# Patient Record
Sex: FEMALE | Race: WHITE | NOT HISPANIC OR LATINO | URBAN - METROPOLITAN AREA
[De-identification: names, ages, dates, MRNs, and addresses within clinical notes are randomized per-mention and may not be internally consistent; named-entity substitution may affect disease eponyms.]

---

## 2017-02-23 ENCOUNTER — OUTPATIENT (OUTPATIENT)
Dept: OUTPATIENT SERVICES | Facility: HOSPITAL | Age: 52
LOS: 1 days | End: 2017-02-23
Payer: COMMERCIAL

## 2017-02-23 ENCOUNTER — APPOINTMENT (OUTPATIENT)
Dept: MRI IMAGING | Facility: IMAGING CENTER | Age: 52
End: 2017-02-23

## 2017-02-23 DIAGNOSIS — Z00.8 ENCOUNTER FOR OTHER GENERAL EXAMINATION: ICD-10-CM

## 2017-04-13 PROCEDURE — A9585: CPT

## 2017-04-13 PROCEDURE — C8937: CPT

## 2017-04-13 PROCEDURE — C8908: CPT

## 2017-07-24 ENCOUNTER — RESULT REVIEW (OUTPATIENT)
Age: 52
End: 2017-07-24

## 2017-08-07 ENCOUNTER — TRANSCRIPTION ENCOUNTER (OUTPATIENT)
Age: 52
End: 2017-08-07

## 2017-09-29 ENCOUNTER — RESULT REVIEW (OUTPATIENT)
Age: 52
End: 2017-09-29

## 2017-12-21 ENCOUNTER — HOSPITAL ENCOUNTER (OUTPATIENT)
Dept: HOSPITAL 74 - FMAMMOTONE | Age: 52
Discharge: HOME | End: 2017-12-21
Attending: SURGERY
Payer: COMMERCIAL

## 2017-12-21 DIAGNOSIS — N62: ICD-10-CM

## 2017-12-21 DIAGNOSIS — N64.89: ICD-10-CM

## 2017-12-21 DIAGNOSIS — N60.11: Primary | ICD-10-CM

## 2017-12-21 PROCEDURE — 0HBT3ZX EXCISION OF RIGHT BREAST, PERCUTANEOUS APPROACH, DIAGNOSTIC: ICD-10-PCS

## 2017-12-21 PROCEDURE — A4648 IMPLANTABLE TISSUE MARKER: HCPCS

## 2017-12-22 NOTE — PATH
Surgical Pathology Report



Patient Name:  VANNESA ROCHE

Accession #:  [id number]

Med. Rec. #:  [id number]                                                        

   /Age/Gender:  1965 (Age: 52) / F

Account:  [id number]                                                          

             Location: [facility]

Taken:  2017

Received:  2017

Reported:  2017

Physicians:  ELVIS Espana M.D.

  



Specimen(s) Received

A: RIGHT BREAST SPECIMEN WITH CALCIFICATIONS 

B: RIGHT BREAST SPECIMEN WITHOUT CALCIFICATIONS 





Clinical History

Nonpalpable lesion

Mammographic findings: Microcalcification, suspicious







Final Diagnosis

A. BREAST, RIGHT, WITH CALCIFICATIONS, STEREOTACTIC BIOPSY:

COLUMNAR CELL CHANGE WITH MICROCYST FORMATION, FOCAL ATYPICAL LOBULAR

HYPERPLASIA (ALH) AND ASSOCIATED CALCIFICATIONS. (SEE NOTE)



Note:E-Cadherin immunostain (performed at [facility]) is

negative in the foci of ALH, which supports lobular phenotype.



B. BREAST, RIGHT, WITHOUT CALCIFICATIONS, STEREOTACTIC BIOPSY:

COLUMNAR CELL CHANGE WITH MICROCYST FORMATION, FOCAL ATYPICAL LOBULAR

HYPERPLASIA (ALH) AND FEW ASSOCIATED CALCIFICATIONS.





***Electronically Signed***

Millie Dalton M.D.





Gross Description

A.  Received in formalin, labeled "right breast with calcifications," are 4

tan-yellow, cylindrical portions of fibroadipose tissue ranging from 0.6-2.3 cm.

in length and averaging 0.3 cm. in diameter. The specimen is submitted in toto

in one cassette. 



B. Received in formalin labeled "right breast without calcifications," is a 2.0

x 1.3 x 0.3 cm aggregate of multiple tan-yellow, irregular to cylindrical

portions of fibroadipose tissue. The formalin is filtered and the specimen is

entirely submitted in one cassette.

Time to formalin fixation: 5 minutes

Total formalin fixation time: Approximately 6 hours.

## 2018-01-10 NOTE — HP
Admitting History and Physical





- Primary Care Physician


PCP: Emre Dyson





- Admission


Chief Complaint: Right breast Atypia


History of Present Illness: 





52 year old premenapusal female with recent mammogram showing 2017 showing 

calcifications right upper outer aspect. Right breast stereotactic core biopsy 

showed ALH 2017.  Another area posteriorly could not be biopsied 

stereotactically.This is her third biopsy of atypia and inability to 

stereotactically localize the posterior calcification therefore excision of 

both areas  with needle localization of clip with atypia and posterior 

calcifications. 


History Source: Patient


Limitations to Obtaining History: No Limitations





- Past Medical History


...LMP: 13





- Past Surgical History


Additional Past Surgical History: 





Right breast excision atypia 2012


Right breast excision  LCIS





- Smoking History


Smoking history: Never smoked


Have you smoked in the past 12 months: No





- Alcohol/Substance Use


Hx Alcohol Use: No





Home Medications





- Allergies


Allergies/Adverse Reactions: 


 Allergies











Allergy/AdvReac Type Severity Reaction Status Date / Time


 


No Known Allergies Allergy   Verified 12 10:25














- Home Medications


Home Medications: 


Ambulatory Orders





Norethindrone AC-Eth Estradiol [Loestrin 21 1-20 Tablet] 1 each PO DAILY  


Rizatriptan Benzoate [Maxalt] 10 mg PO DAILY PRN 14 


Oxycodone HCl/Acetaminophen [Percocet 5-325 mg Tablet -] 1 - 2 tab PO Q6H PRN #

20 tab 14 











Family Disease History





- Family Disease History


Family Disease History: CA: Grandparent (mat GM gastric ca )





Physical Examination


Constitutional: Yes: No Distress


Breast(s): Yes: Other (C cup breasts symmetrical no palpable densities or 

adenopathy bilaterally)





Problem List





- Problems


(1) Atypical ductal hyperplasia of right breast


Code(s): N60.91 - UNSPECIFIED BENIGN MAMMARY DYSPLASIA OF RIGHT BREAST   





Assessment/Plan





Right breast wide excision  with needle localiztion x2

## 2018-01-18 ENCOUNTER — HOSPITAL ENCOUNTER (OUTPATIENT)
Dept: HOSPITAL 74 - FASU | Age: 53
Discharge: HOME | End: 2018-01-18
Attending: SURGERY
Payer: COMMERCIAL

## 2018-01-18 VITALS — TEMPERATURE: 97.9 F

## 2018-01-18 VITALS — DIASTOLIC BLOOD PRESSURE: 69 MMHG | HEART RATE: 95 BPM | SYSTOLIC BLOOD PRESSURE: 118 MMHG

## 2018-01-18 VITALS — BODY MASS INDEX: 33 KG/M2

## 2018-01-18 DIAGNOSIS — N62: ICD-10-CM

## 2018-01-18 DIAGNOSIS — N64.89: ICD-10-CM

## 2018-01-18 DIAGNOSIS — N60.31: ICD-10-CM

## 2018-01-18 DIAGNOSIS — N60.91: Primary | ICD-10-CM

## 2018-01-18 PROCEDURE — 0HBT0ZX EXCISION OF RIGHT BREAST, OPEN APPROACH, DIAGNOSTIC: ICD-10-PCS | Performed by: SURGERY

## 2018-01-19 NOTE — OP
DATE OF OPERATION:  2018

 

PREOPERATIVE DIAGNOSIS:  Right breast upper outer quadrant atypia.  

 

POSTOPERATIVE DIAGNOSIS:  Right breast upper outer quadrant atypia.  

 

PROCEDURE:  Right breast wide excision with needle localization x2.  

 

ANESTHESIA:  General laryngeal mask airway anesthesia. 

 

PRIMARY SURGEON:  Sandra Dyson MD

 

FIRST ASSISTANT:  SILVA Caballero

 

COMPLICATIONS:  There were no complications.  

 

INDICATIONS:  Briefly, the patient is a 52-year-old  premenopausal white female

with no family history of breast or ovarian cancer.  She has undergone 2 right breast

wide excisions in the past in  and  for atypical duct hyperplasia.  She has

been followed closely with the early mammography ultrasound and MRIs given her

increased scale model risk.  She was recently found to have some new calcifications

on the upper outer aspect of the right breast, and stereotactic biopsy showed a focal

area of atypical lobular hyperplasia.  The patient was advised on undergoing a wide

excision again.  There were some more calcifications posteriorly for which a 2nd

needle localization was recommended.   

 

The patient was brought in for the wide excision through ambulatory surgery on

2018.  She underwent needle localizations of the clip in the upper outer

aspect of the right breast as well as the calcifications just posterior to this.  She

was then brought to the holding area.  In the holding area, site verification was

made and informed consent was obtained.  

 

DESCRIPTION OF PROCEDURE:  She was brought into the operating room and laid on the OR

table in the supine position.  Venodynes were placed on the lower extremities prior

to induction.  She did not receive any antibiotics given the small nature of the

excision.  She was given general laryngeal mask airway anesthesia.  The right breast

was sterilely prepped and draped in the usual fashion.  

 

Lidocaine 1% and bupivacaine were given underneath the area of the needle

localizations, and a curvilinear incision was made towards the upper outer aspect of

the right breast, and dissection was undertaken around both needle localizations. 

The wide excision was able to remove both wires with the same specimen with the 1

wire more anterior where the clip was and the calcifications more posterior.  The

specimen was removed and oriented with the long-lateral short-superior suture. 

Specimen radiographs showed removal of the clip as well as calcifications. 

Hemostasis was achieved.  

 

The breast parenchyma was then reapproximated using 2-0 plain suture.  The skin was

closed using interrupted 3-0 deep dermal Vicryl suture and a running 4-0 subcuticular

Biosyn suture.  Mastisol and Steri-Strips were applied over the wound with a

compressive dressing placed over this.  The patient tolerated the procedure well

without difficulty, and laryngeal mask airway tube was removed at the end of the

case.  She will be brought to the post-anesthesia care unit where she will be

recovered and discharged home the same day once her discharge criteria are met.  She

is to follow up in the office in one week for a formal wound pathology check.  All

sponge and needle counts were correct at the end of the case, and estimated blood

loss was minimal.  .  

 

 

SANDRA DYSON M.D.

 

[id number]

DD: 2018 18:54

DT: 2018 07:17

Job #:  [id number]

## 2018-01-23 NOTE — PATH
Surgical Pathology Report



Patient Name:  VANNESA ROCHE

Accession #:  [id number]

[facility]. Rec. #:  [id number]                                                        

   /Age/Gender:  1965 (Age: 52) / F

Account:  [id number]                                                          

             Location: [facility] 

Taken:  2018

Received:  2018

Reported:  2018

Physicians:  Emre Dyson M.D.

  



Specimen(s) Received

 RIGHT BREAST WIDE EXCISION 





Clinical History

Atypia on previous core biopsy

Mammographic findings: Microcalcification







Final Diagnosis

BREAST, RIGHT, WIDE EXCISION:

ATYPICAL LOBULAR HYPERPLASIA (ALH) AND ATYPICAL DUCTAL HYPERPLASIA (ADH) IN A

BACKGROUND OF PRIOR BIOPSY CHANGES, FIBROCYSTIC CHANGES INCLUDING STROMAL

FIBROSIS, APOCRINE METAPLASIA, MICROCYSTS, COLUMNAR CELL CHANGES, AND ASSOCIATED

MICROCALCIFICATIONS.





***Electronically Signed***

Charlene Jean M.D.





Gross Description

Received in formalin, labeled "right breast wide excision," is a 5.3 x 3.7 x 2.1

cm. tan-yellow, irregular, portion of fibroadipose tissue 2 needle localization

wires present. There is a short suture marking the superior aspect and a long

suture marking the lateral aspect, per the surgeon. There is no skin or nipple

present. The specimen is inked as follows: superior and lateral blue; inferior

green; medial yellow; anterior red; deep black. The specimen is serially

sectioned from medial to lateral. Sectioning reveals a focus of hemorrhage

surrounded by dense, white, firm fibrous tissue. No definitive mass is

identified. Representative sections are submitted in 7 cassettes as follows:

1-4-fibrous tissue (each with superior, anterior and deep margins); 5-inferior

margin; 6-medial margin; 7-lateral margin.



Total formalin fixation time: Between 34-36 hours

## 2018-05-24 ENCOUNTER — RESULT REVIEW (OUTPATIENT)
Age: 53
End: 2018-05-24

## 2018-09-05 ENCOUNTER — OUTPATIENT (OUTPATIENT)
Dept: OUTPATIENT SERVICES | Facility: HOSPITAL | Age: 53
LOS: 1 days | End: 2018-09-05
Payer: COMMERCIAL

## 2018-09-05 ENCOUNTER — APPOINTMENT (OUTPATIENT)
Dept: MRI IMAGING | Facility: IMAGING CENTER | Age: 53
End: 2018-09-05
Payer: COMMERCIAL

## 2018-09-05 DIAGNOSIS — Z00.8 ENCOUNTER FOR OTHER GENERAL EXAMINATION: ICD-10-CM

## 2018-09-05 PROCEDURE — 0159T: CPT | Mod: 26

## 2018-09-05 PROCEDURE — 77059 MRI BREAST BILATERAL: CPT | Mod: 26

## 2018-09-05 PROCEDURE — A9585: CPT

## 2018-09-05 PROCEDURE — C8937: CPT

## 2018-09-05 PROCEDURE — C8908: CPT

## 2018-12-05 ENCOUNTER — RESULT REVIEW (OUTPATIENT)
Age: 53
End: 2018-12-05

## 2019-01-03 ENCOUNTER — APPOINTMENT (OUTPATIENT)
Dept: ORTHOPEDIC SURGERY | Facility: CLINIC | Age: 54
End: 2019-01-03
Payer: COMMERCIAL

## 2019-01-03 VITALS
DIASTOLIC BLOOD PRESSURE: 82 MMHG | BODY MASS INDEX: 32.1 KG/M2 | WEIGHT: 170 LBS | HEART RATE: 79 BPM | SYSTOLIC BLOOD PRESSURE: 122 MMHG | HEIGHT: 61 IN

## 2019-01-03 DIAGNOSIS — M54.5 LOW BACK PAIN: ICD-10-CM

## 2019-01-03 DIAGNOSIS — Z87.19 PERSONAL HISTORY OF OTHER DISEASES OF THE DIGESTIVE SYSTEM: ICD-10-CM

## 2019-01-03 PROCEDURE — 99204 OFFICE O/P NEW MOD 45 MIN: CPT

## 2019-01-03 PROCEDURE — 72100 X-RAY EXAM L-S SPINE 2/3 VWS: CPT

## 2019-12-06 ENCOUNTER — RESULT REVIEW (OUTPATIENT)
Age: 54
End: 2019-12-06

## 2019-12-26 ENCOUNTER — TRANSCRIPTION ENCOUNTER (OUTPATIENT)
Age: 54
End: 2019-12-26

## 2020-01-20 ENCOUNTER — OUTPATIENT (OUTPATIENT)
Dept: OUTPATIENT SERVICES | Facility: HOSPITAL | Age: 55
LOS: 1 days | End: 2020-01-20
Payer: COMMERCIAL

## 2020-01-20 ENCOUNTER — APPOINTMENT (OUTPATIENT)
Dept: MRI IMAGING | Facility: IMAGING CENTER | Age: 55
End: 2020-01-20

## 2020-01-20 DIAGNOSIS — Z80.3 FAMILY HISTORY OF MALIGNANT NEOPLASM OF BREAST: ICD-10-CM

## 2020-01-20 DIAGNOSIS — N60.09 SOLITARY CYST OF UNSPECIFIED BREAST: ICD-10-CM

## 2020-01-20 DIAGNOSIS — N60.11 DIFFUSE CYSTIC MASTOPATHY OF RIGHT BREAST: ICD-10-CM

## 2020-01-20 DIAGNOSIS — D05.00 LOBULAR CARCINOMA IN SITU OF UNSPECIFIED BREAST: ICD-10-CM

## 2020-01-20 PROCEDURE — C8937: CPT

## 2020-01-20 PROCEDURE — A9585: CPT

## 2020-01-20 PROCEDURE — C8908: CPT

## 2020-01-20 PROCEDURE — 77049 MRI BREAST C-+ W/CAD BI: CPT | Mod: 26

## 2021-02-03 ENCOUNTER — NON-APPOINTMENT (OUTPATIENT)
Age: 56
End: 2021-02-03

## 2021-02-03 ENCOUNTER — APPOINTMENT (OUTPATIENT)
Dept: OPHTHALMOLOGY | Facility: CLINIC | Age: 56
End: 2021-02-03
Payer: COMMERCIAL

## 2021-02-03 PROCEDURE — 99072 ADDL SUPL MATRL&STAF TM PHE: CPT

## 2021-02-03 PROCEDURE — 92004 COMPRE OPH EXAM NEW PT 1/>: CPT

## 2021-02-03 PROCEDURE — 92025 CPTRIZED CORNEAL TOPOGRAPHY: CPT

## 2022-10-21 ENCOUNTER — NON-APPOINTMENT (OUTPATIENT)
Age: 57
End: 2022-10-21

## 2022-11-30 ENCOUNTER — APPOINTMENT (OUTPATIENT)
Dept: SURGERY | Facility: CLINIC | Age: 57
End: 2022-11-30

## 2022-11-30 VITALS
WEIGHT: 170 LBS | TEMPERATURE: 96.8 F | HEART RATE: 78 BPM | OXYGEN SATURATION: 97 % | DIASTOLIC BLOOD PRESSURE: 79 MMHG | RESPIRATION RATE: 18 BRPM | HEIGHT: 61 IN | BODY MASS INDEX: 32.1 KG/M2 | SYSTOLIC BLOOD PRESSURE: 119 MMHG

## 2022-11-30 DIAGNOSIS — Z87.898 PERSONAL HISTORY OF OTHER SPECIFIED CONDITIONS: ICD-10-CM

## 2022-11-30 DIAGNOSIS — Z82.0 FAMILY HISTORY OF EPILEPSY AND OTHER DISEASES OF THE NERVOUS SYSTEM: ICD-10-CM

## 2022-11-30 DIAGNOSIS — K80.50 CALCULUS OF BILE DUCT W/OUT CHOLANGITIS OR CHOLECYSTITIS W/OUT OBSTRUCTION: ICD-10-CM

## 2022-11-30 DIAGNOSIS — K80.20 CALCULUS OF GALLBLADDER W/OUT CHOLECYSTITIS W/OUT OBSTRUCTION: ICD-10-CM

## 2022-11-30 PROCEDURE — 99204 OFFICE O/P NEW MOD 45 MIN: CPT

## 2022-11-30 NOTE — HISTORY OF PRESENT ILLNESS
[de-identified] : Sujey is a 56 y/o female here for a consultation, gallbladder disease.  [de-identified] : This 57-year-old patient complains of having had 2-3 episodes of severe right upper quadrant pain radiating to the right back over the last year or so.  She had nausea though no emesis.  These episodes lasted several hours then subsided spontaneously.  She complains of fatty food intolerance.

## 2022-11-30 NOTE — DATA REVIEWED
[FreeTextEntry1] : I reviewed a CAT scan from November 16 which was consistent with a 2.8 cm gallstone and a normal caliber common bile duct.  Another smaller stone was also seen.  A sonogram from October 28 revealed a gallstone and a common bile duct of 2.2 cm.  No wall thickening or pericholecystic fluid was seen on either study

## 2022-11-30 NOTE — ASSESSMENT
[FreeTextEntry1] : This patient is suffering from biliary colic.  I have offered her a laparoscopic cholecystectomy.  The procedure as well as risks(including, but not limited to bleeding, infection, injury to hollow viscus, injury to bile ducts), benefits (pain relief), and alternatives were explained to the patient.\par \par Please advise me on the safety of general anesthesia for this pleasant patient

## 2022-11-30 NOTE — PHYSICAL EXAM
[Normal Breath Sounds] : Normal breath sounds [Normal Heart Sounds] : normal heart sounds [No Rash or Lesion] : No rash or lesion [Alert] : alert [Oriented to Person] : oriented to person [Oriented to Place] : oriented to place [Oriented to Time] : oriented to time [Calm] : calm [de-identified] : No distress [de-identified] : Sclera clear [de-identified] : Obese soft and nontender.  There are no masses and there is no Sue's sign

## 2022-11-30 NOTE — CONSULT LETTER
[Dear  ___] : Dear  [unfilled], [Consult Letter:] : I had the pleasure of evaluating your patient, [unfilled]. [Please see my note below.] : Please see my note below. [Consult Closing:] : Thank you very much for allowing me to participate in the care of this patient.  If you have any questions, please do not hesitate to contact me. [Sincerely,] : Sincerely, [FreeTextEntry3] : Robert Lopez MD, FACS\par [facility]\par [facility]\par [address]\par [place]  [address]\par Tel: [phone]\par Email: [email or web address] \par

## 2022-12-08 ENCOUNTER — APPOINTMENT (OUTPATIENT)
Dept: OPHTHALMOLOGY | Facility: CLINIC | Age: 57
End: 2022-12-08

## 2022-12-08 ENCOUNTER — NON-APPOINTMENT (OUTPATIENT)
Age: 57
End: 2022-12-08

## 2022-12-08 PROCEDURE — 92014 COMPRE OPH EXAM EST PT 1/>: CPT

## 2022-12-09 ENCOUNTER — OUTPATIENT (OUTPATIENT)
Dept: OUTPATIENT SERVICES | Facility: HOSPITAL | Age: 57
LOS: 1 days | End: 2022-12-09
Payer: COMMERCIAL

## 2022-12-09 VITALS
DIASTOLIC BLOOD PRESSURE: 77 MMHG | WEIGHT: 166.01 LBS | SYSTOLIC BLOOD PRESSURE: 109 MMHG | RESPIRATION RATE: 20 BRPM | HEIGHT: 61 IN | TEMPERATURE: 98 F | OXYGEN SATURATION: 97 % | HEART RATE: 75 BPM

## 2022-12-09 DIAGNOSIS — Z01.818 ENCOUNTER FOR OTHER PREPROCEDURAL EXAMINATION: ICD-10-CM

## 2022-12-09 DIAGNOSIS — K80.20 CALCULUS OF GALLBLADDER WITHOUT CHOLECYSTITIS WITHOUT OBSTRUCTION: ICD-10-CM

## 2022-12-09 DIAGNOSIS — Z98.890 OTHER SPECIFIED POSTPROCEDURAL STATES: Chronic | ICD-10-CM

## 2022-12-09 DIAGNOSIS — K80.50 CALCULUS OF BILE DUCT WITHOUT CHOLANGITIS OR CHOLECYSTITIS WITHOUT OBSTRUCTION: ICD-10-CM

## 2022-12-09 LAB
ALBUMIN SERPL ELPH-MCNC: 4.3 G/DL — SIGNIFICANT CHANGE UP (ref 3.3–5)
ALP SERPL-CCNC: 79 U/L — SIGNIFICANT CHANGE UP (ref 40–120)
ALT FLD-CCNC: 8 U/L — LOW (ref 10–45)
ANION GAP SERPL CALC-SCNC: 11 MMOL/L — SIGNIFICANT CHANGE UP (ref 5–17)
AST SERPL-CCNC: 15 U/L — SIGNIFICANT CHANGE UP (ref 10–40)
BILIRUB SERPL-MCNC: 0.3 MG/DL — SIGNIFICANT CHANGE UP (ref 0.2–1.2)
BUN SERPL-MCNC: 14 MG/DL — SIGNIFICANT CHANGE UP (ref 7–23)
CALCIUM SERPL-MCNC: 9.3 MG/DL — SIGNIFICANT CHANGE UP (ref 8.4–10.5)
CHLORIDE SERPL-SCNC: 104 MMOL/L — SIGNIFICANT CHANGE UP (ref 96–108)
CO2 SERPL-SCNC: 24 MMOL/L — SIGNIFICANT CHANGE UP (ref 22–31)
CREAT SERPL-MCNC: 0.6 MG/DL — SIGNIFICANT CHANGE UP (ref 0.5–1.3)
EGFR: 105 ML/MIN/1.73M2 — SIGNIFICANT CHANGE UP
GLUCOSE SERPL-MCNC: 83 MG/DL — SIGNIFICANT CHANGE UP (ref 70–99)
HCT VFR BLD CALC: 38.5 % — SIGNIFICANT CHANGE UP (ref 34.5–45)
HGB BLD-MCNC: 11.7 G/DL — SIGNIFICANT CHANGE UP (ref 11.5–15.5)
MCHC RBC-ENTMCNC: 21.7 PG — LOW (ref 27–34)
MCHC RBC-ENTMCNC: 30.4 GM/DL — LOW (ref 32–36)
MCV RBC AUTO: 71.6 FL — LOW (ref 80–100)
NRBC # BLD: 0 /100 WBCS — SIGNIFICANT CHANGE UP (ref 0–0)
PLATELET # BLD AUTO: 325 K/UL — SIGNIFICANT CHANGE UP (ref 150–400)
POTASSIUM SERPL-MCNC: 4.1 MMOL/L — SIGNIFICANT CHANGE UP (ref 3.5–5.3)
POTASSIUM SERPL-SCNC: 4.1 MMOL/L — SIGNIFICANT CHANGE UP (ref 3.5–5.3)
PROT SERPL-MCNC: 7 G/DL — SIGNIFICANT CHANGE UP (ref 6–8.3)
RBC # BLD: 5.38 M/UL — HIGH (ref 3.8–5.2)
RBC # FLD: 15.7 % — HIGH (ref 10.3–14.5)
SODIUM SERPL-SCNC: 139 MMOL/L — SIGNIFICANT CHANGE UP (ref 135–145)
WBC # BLD: 8.23 K/UL — SIGNIFICANT CHANGE UP (ref 3.8–10.5)
WBC # FLD AUTO: 8.23 K/UL — SIGNIFICANT CHANGE UP (ref 3.8–10.5)

## 2022-12-09 PROCEDURE — 80053 COMPREHEN METABOLIC PANEL: CPT

## 2022-12-09 PROCEDURE — 85027 COMPLETE CBC AUTOMATED: CPT

## 2022-12-09 PROCEDURE — 36415 COLL VENOUS BLD VENIPUNCTURE: CPT

## 2022-12-09 PROCEDURE — G0463: CPT

## 2022-12-09 RX ORDER — SODIUM CHLORIDE 9 MG/ML
1000 INJECTION, SOLUTION INTRAVENOUS
Refills: 0 | Status: DISCONTINUED | OUTPATIENT
Start: 2022-12-19 | End: 2023-01-02

## 2022-12-09 NOTE — H&P PST ADULT - NSICDXPASTMEDICALHX_GEN_ALL_CORE_FT
PAST MEDICAL HISTORY:  Cholelithiases     Hyperlipidemia     Migraines      PAST MEDICAL HISTORY:  Cholelithiases     Hyperlipidemia     Migraines     Obesity

## 2022-12-09 NOTE — H&P PST ADULT - HISTORY OF PRESENT ILLNESS
57 yr old female with history of Hyperlipidemia , Cholelithiases with c/o right upper quadrant pain - 2-3 episodes in past 1 year. Now coming in for Laparoscopic cholecystectomy on 12/19/2022.     Denies Recent travel, Exposure or Covid symptoms  Covid test- 12/16/2022

## 2022-12-16 ENCOUNTER — OUTPATIENT (OUTPATIENT)
Dept: OUTPATIENT SERVICES | Facility: HOSPITAL | Age: 57
LOS: 1 days | End: 2022-12-16
Payer: COMMERCIAL

## 2022-12-16 DIAGNOSIS — Z11.52 ENCOUNTER FOR SCREENING FOR COVID-19: ICD-10-CM

## 2022-12-16 DIAGNOSIS — Z98.890 OTHER SPECIFIED POSTPROCEDURAL STATES: Chronic | ICD-10-CM

## 2022-12-17 LAB — SARS-COV-2 RNA SPEC QL NAA+PROBE: SIGNIFICANT CHANGE UP

## 2022-12-17 PROCEDURE — U0003: CPT

## 2022-12-17 PROCEDURE — U0005: CPT

## 2022-12-17 PROCEDURE — C9803: CPT

## 2022-12-18 ENCOUNTER — TRANSCRIPTION ENCOUNTER (OUTPATIENT)
Age: 57
End: 2022-12-18

## 2022-12-19 ENCOUNTER — TRANSCRIPTION ENCOUNTER (OUTPATIENT)
Age: 57
End: 2022-12-19

## 2022-12-19 ENCOUNTER — APPOINTMENT (OUTPATIENT)
Dept: SURGERY | Facility: HOSPITAL | Age: 57
End: 2022-12-19

## 2022-12-19 ENCOUNTER — RESULT REVIEW (OUTPATIENT)
Age: 57
End: 2022-12-19

## 2022-12-19 ENCOUNTER — OUTPATIENT (OUTPATIENT)
Dept: OUTPATIENT SERVICES | Facility: HOSPITAL | Age: 57
LOS: 1 days | End: 2022-12-19
Payer: COMMERCIAL

## 2022-12-19 VITALS
SYSTOLIC BLOOD PRESSURE: 104 MMHG | TEMPERATURE: 97 F | RESPIRATION RATE: 18 BRPM | HEART RATE: 76 BPM | OXYGEN SATURATION: 96 % | DIASTOLIC BLOOD PRESSURE: 58 MMHG

## 2022-12-19 VITALS
DIASTOLIC BLOOD PRESSURE: 80 MMHG | SYSTOLIC BLOOD PRESSURE: 120 MMHG | TEMPERATURE: 98 F | HEART RATE: 79 BPM | HEIGHT: 61 IN | RESPIRATION RATE: 15 BRPM | WEIGHT: 166.01 LBS | OXYGEN SATURATION: 98 %

## 2022-12-19 DIAGNOSIS — Z98.890 OTHER SPECIFIED POSTPROCEDURAL STATES: Chronic | ICD-10-CM

## 2022-12-19 DIAGNOSIS — K80.50 CALCULUS OF BILE DUCT WITHOUT CHOLANGITIS OR CHOLECYSTITIS WITHOUT OBSTRUCTION: ICD-10-CM

## 2022-12-19 PROCEDURE — C9399: CPT

## 2022-12-19 PROCEDURE — 88304 TISSUE EXAM BY PATHOLOGIST: CPT | Mod: 26

## 2022-12-19 PROCEDURE — 88304 TISSUE EXAM BY PATHOLOGIST: CPT

## 2022-12-19 PROCEDURE — C1889: CPT

## 2022-12-19 PROCEDURE — 47563 LAPARO CHOLECYSTECTOMY/GRAPH: CPT

## 2022-12-19 DEVICE — LIGATING CLIPS WECK HEMOLOK POLYMER MEDIUM-LARGE (GREEN) 6: Type: IMPLANTABLE DEVICE | Status: FUNCTIONAL

## 2022-12-19 RX ORDER — LIDOCAINE HCL 20 MG/ML
0.2 VIAL (ML) INJECTION ONCE
Refills: 0 | Status: COMPLETED | OUTPATIENT
Start: 2022-12-19 | End: 2022-12-19

## 2022-12-19 RX ORDER — CHLORHEXIDINE GLUCONATE 213 G/1000ML
1 SOLUTION TOPICAL ONCE
Refills: 0 | Status: COMPLETED | OUTPATIENT
Start: 2022-12-19 | End: 2022-12-19

## 2022-12-19 RX ORDER — APREPITANT 80 MG/1
40 CAPSULE ORAL ONCE
Refills: 0 | Status: COMPLETED | OUTPATIENT
Start: 2022-12-19 | End: 2022-12-19

## 2022-12-19 RX ORDER — ACETAMINOPHEN 500 MG
1 TABLET ORAL
Qty: 28 | Refills: 0
Start: 2022-12-19 | End: 2022-12-25

## 2022-12-19 RX ORDER — OXYCODONE HYDROCHLORIDE 5 MG/1
1 TABLET ORAL
Qty: 6 | Refills: 0
Start: 2022-12-19 | End: 2022-12-21

## 2022-12-19 RX ORDER — OXYCODONE HYDROCHLORIDE 5 MG/1
1 TABLET ORAL
Qty: 0 | Refills: 0 | DISCHARGE
Start: 2022-12-19

## 2022-12-19 RX ORDER — OXYCODONE HYDROCHLORIDE 5 MG/1
5 TABLET ORAL ONCE
Refills: 0 | Status: DISCONTINUED | OUTPATIENT
Start: 2022-12-19 | End: 2022-12-19

## 2022-12-19 RX ORDER — CEFOTETAN DISODIUM 1 G
2 VIAL (EA) INJECTION ONCE
Refills: 0 | Status: COMPLETED | OUTPATIENT
Start: 2022-12-19 | End: 2022-12-19

## 2022-12-19 RX ORDER — HYDROMORPHONE HYDROCHLORIDE 2 MG/ML
0.25 INJECTION INTRAMUSCULAR; INTRAVENOUS; SUBCUTANEOUS
Refills: 0 | Status: DISCONTINUED | OUTPATIENT
Start: 2022-12-19 | End: 2022-12-19

## 2022-12-19 RX ORDER — ONDANSETRON 8 MG/1
4 TABLET, FILM COATED ORAL ONCE
Refills: 0 | Status: DISCONTINUED | OUTPATIENT
Start: 2022-12-19 | End: 2023-01-02

## 2022-12-19 RX ADMIN — SODIUM CHLORIDE 100 MILLILITER(S): 9 INJECTION, SOLUTION INTRAVENOUS at 05:50

## 2022-12-19 RX ADMIN — APREPITANT 40 MILLIGRAM(S): 80 CAPSULE ORAL at 05:49

## 2022-12-19 RX ADMIN — CHLORHEXIDINE GLUCONATE 1 APPLICATION(S): 213 SOLUTION TOPICAL at 05:49

## 2022-12-19 NOTE — ASU DISCHARGE PLAN (ADULT/PEDIATRIC) - CARE PROVIDER_API CALL
Robert Mena)  Surgery  [address], [address]  [place] [address]  Phone: [phone]  Fax: [phone]  Follow Up Time: 2 weeks

## 2022-12-19 NOTE — ASU PATIENT PROFILE, ADULT - FALL HARM RISK - UNIVERSAL INTERVENTIONS
Bed in lowest position, wheels locked, appropriate side rails in place/Call bell, personal items and telephone in reach/Instruct patient to call for assistance before getting out of bed or chair/Non-slip footwear when patient is out of bed/[place] to call system/Physically safe environment - no spills, clutter or unnecessary equipment/Purposeful Proactive Rounding/Room/bathroom lighting operational, light cord in reach

## 2022-12-19 NOTE — ASU DISCHARGE PLAN (ADULT/PEDIATRIC) - NS MD DC FALL RISK RISK
For information on Fall & Injury Prevention, visit: [email or web address] OR  [email or web address] OR  https://www.cdc.gov/steadi/patient.html

## 2022-12-20 PROBLEM — G43.909 MIGRAINE, UNSPECIFIED, NOT INTRACTABLE, WITHOUT STATUS MIGRAINOSUS: Chronic | Status: ACTIVE | Noted: 2022-12-09

## 2022-12-20 PROBLEM — E78.5 HYPERLIPIDEMIA, UNSPECIFIED: Chronic | Status: ACTIVE | Noted: 2022-12-09

## 2022-12-20 PROBLEM — E66.9 OBESITY, UNSPECIFIED: Chronic | Status: ACTIVE | Noted: 2022-12-09

## 2022-12-20 PROBLEM — K80.20 CALCULUS OF GALLBLADDER WITHOUT CHOLECYSTITIS WITHOUT OBSTRUCTION: Chronic | Status: ACTIVE | Noted: 2022-12-09

## 2022-12-29 PROBLEM — Z90.49 S/P LAPAROSCOPIC CHOLECYSTECTOMY: Status: ACTIVE | Noted: 2022-12-29

## 2022-12-29 LAB — SURGICAL PATHOLOGY STUDY: SIGNIFICANT CHANGE UP

## 2023-01-04 ENCOUNTER — APPOINTMENT (OUTPATIENT)
Dept: SURGERY | Facility: CLINIC | Age: 58
End: 2023-01-04
Payer: COMMERCIAL

## 2023-01-04 DIAGNOSIS — Z90.49 ACQUIRED ABSENCE OF OTHER SPECIFIED PARTS OF DIGESTIVE TRACT: ICD-10-CM

## 2023-01-11 ENCOUNTER — APPOINTMENT (OUTPATIENT)
Dept: SURGERY | Facility: CLINIC | Age: 58
End: 2023-01-11
Payer: COMMERCIAL

## 2023-01-11 VITALS
OXYGEN SATURATION: 98 % | HEART RATE: 78 BPM | HEIGHT: 61 IN | BODY MASS INDEX: 32.1 KG/M2 | DIASTOLIC BLOOD PRESSURE: 73 MMHG | SYSTOLIC BLOOD PRESSURE: 111 MMHG | TEMPERATURE: 96.6 F | WEIGHT: 170 LBS | RESPIRATION RATE: 16 BRPM

## 2023-01-11 DIAGNOSIS — Z09 ENCOUNTER FOR FOLLOW-UP EXAMINATION AFTER COMPLETED TREATMENT FOR CONDITIONS OTHER THAN MALIGNANT NEOPLASM: ICD-10-CM

## 2023-01-11 PROCEDURE — 99024 POSTOP FOLLOW-UP VISIT: CPT

## 2023-01-11 NOTE — HISTORY OF PRESENT ILLNESS
[de-identified] : Sujey is 57yr. old female being seen for post op visit s/p Laparoscopic cholecystectomy with ICG ductal imaging on 12/19/2022 [de-identified] : This patient offers no complaints

## 2023-09-25 ENCOUNTER — APPOINTMENT (OUTPATIENT)
Dept: PLASTIC SURGERY | Facility: CLINIC | Age: 58
End: 2023-09-25
Payer: COMMERCIAL

## 2023-09-25 VITALS
DIASTOLIC BLOOD PRESSURE: 96 MMHG | TEMPERATURE: 97.7 F | BODY MASS INDEX: 30.96 KG/M2 | HEIGHT: 61 IN | WEIGHT: 164 LBS | SYSTOLIC BLOOD PRESSURE: 131 MMHG | HEART RATE: 68 BPM | OXYGEN SATURATION: 99 %

## 2023-09-25 DIAGNOSIS — N60.91 UNSPECIFIED BENIGN MAMMARY DYSPLASIA OF RIGHT BREAST: ICD-10-CM

## 2023-09-25 DIAGNOSIS — Z91.89 OTHER SPECIFIED PERSONAL RISK FACTORS, NOT ELSEWHERE CLASSIFIED: ICD-10-CM

## 2023-09-25 DIAGNOSIS — Z86.000 PERSONAL HISTORY OF IN-SITU NEOPLASM OF BREAST: ICD-10-CM

## 2023-09-25 PROCEDURE — 99213 OFFICE O/P EST LOW 20 MIN: CPT

## 2023-10-13 ENCOUNTER — NON-APPOINTMENT (OUTPATIENT)
Age: 58
End: 2023-10-13

## 2024-05-09 ENCOUNTER — APPOINTMENT (OUTPATIENT)
Dept: OBGYN | Facility: CLINIC | Age: 59
End: 2024-05-09
Payer: COMMERCIAL

## 2024-05-09 VITALS
DIASTOLIC BLOOD PRESSURE: 81 MMHG | HEIGHT: 61 IN | WEIGHT: 163 LBS | BODY MASS INDEX: 30.78 KG/M2 | SYSTOLIC BLOOD PRESSURE: 114 MMHG

## 2024-05-09 DIAGNOSIS — N60.91 UNSPECIFIED BENIGN MAMMARY DYSPLASIA OF RIGHT BREAST: ICD-10-CM

## 2024-05-09 DIAGNOSIS — Z01.419 ENCOUNTER FOR GYNECOLOGICAL EXAMINATION (GENERAL) (ROUTINE) W/OUT ABNORMAL FINDINGS: ICD-10-CM

## 2024-05-09 PROCEDURE — 82270 OCCULT BLOOD FECES: CPT

## 2024-05-09 PROCEDURE — 99386 PREV VISIT NEW AGE 40-64: CPT

## 2024-05-10 LAB — HPV HIGH+LOW RISK DNA PNL CVX: NOT DETECTED

## 2024-05-15 LAB — CYTOLOGY CVX/VAG DOC THIN PREP: ABNORMAL

## 2024-07-12 ENCOUNTER — APPOINTMENT (OUTPATIENT)
Dept: OBGYN | Facility: CLINIC | Age: 59
End: 2024-07-12
Payer: COMMERCIAL

## 2024-07-12 ENCOUNTER — ASOB RESULT (OUTPATIENT)
Age: 59
End: 2024-07-12

## 2024-07-12 PROCEDURE — 77080 DXA BONE DENSITY AXIAL: CPT

## 2024-07-12 PROCEDURE — 76830 TRANSVAGINAL US NON-OB: CPT

## 2024-09-12 NOTE — PHYSICAL EXAM
[Normocephalic] : normocephalic [Atraumatic] : atraumatic [EOMI] : extra ocular movement intact [Sclera nonicteric] : sclera nonicteric [Supple] : supple [No Supraclavicular Adenopathy] : no supraclavicular adenopathy [No Cervical Adenopathy] : no cervical adenopathy [No Thyromegaly] : no thyromegaly [Examined in the supine and seated position] : examined in the supine and seated position [Asymmetrical] : asymmetrical [No dominant masses] : no dominant masses in right breast  [No dominant masses] : no dominant masses left breast [No Nipple Retraction] : no left nipple retraction [No Nipple Discharge] : no left nipple discharge [No Axillary Lymphadenopathy] : no left axillary lymphadenopathy [No Edema] : no edema [No Rashes] : no rashes [No Ulceration] : no ulceration [de-identified] : her left breast is larger than her right [de-identified] : right breast scar

## 2024-09-12 NOTE — HISTORY OF PRESENT ILLNESS
[FreeTextEntry1] : Patient is a 59 year old female here today for a follow up visit. She has a history of right breast atypical lobular hyperplasia (ALH), atypical ductal hyperplasia (ADH) and (LCIS).  12/06/2012 Right 10:00 surgical excision for ALH.  5/29/14 right central outer surgical excision for LCIS and ADH 1/18/2018 Right upper outer surgical excision for ALH and ADH. She deferred meeting with a medical oncologist to discuss antihormonal therapy for breast cancer prevention. 2020 s/p right breast excisional biopsy for calcifications. Pathology reportedly benign (surgeon in [facility]) 2/08/2023 Bilateral mammogram: No suspicious masses, calcifications, or other findings are seen in the right breast. Postoperative findings are seen in the right breast. A cyst is seen in the right 4:00 axis. Benign-appearing calcifications are noted in both breasts. A 4 mm mass is questioned in the left retroareolar region posterior depth. 2/0/82023 Left callback mammogram: No suspicious masses, calcifications, or other findings are seen. The left retroareolar posterior depth mass questioned on screening is not reproduced on spot compression imaging. No sonographic correlate is seen. Bilateral ultrasound: No suspicious abnormalities were seen sonographically. Scarring is seen in the right 10:00 axis. There are multiple bilateral cysts and cysts with debris. They average 6 mm mm in the right breast and range from 2-11 mm in the left breast. Mammogram recommended in 1 year. BI-RADS 2. 3/6/2024 Bilateral mammogram: Dense breasts. Postoperative findings are seen in the right breast. A cyst is seen in the right 4:00 axis. Benign-appearing calcifications are noted in both breasts. BI-RADS 2. Bilateral ultrasound: Scarring is seen in the right 10:00 axis. There are multiple bilateral cysts and cysts with debris. They range from 4-6 mm in the right breast and 3-11 mm in the left breast. BI-RADS 2. 5/10/2024 Bilateral MRI: Benign. BI-RADS 1. She denies any concerns on self breast exam.

## 2024-09-12 NOTE — ASSESSMENT
[FreeTextEntry1] : History of right breast ADH, ALH and LCIS She is at increased risk for breast cancer Clinical breast exam today negative  1. Annual bilateral mammogram and breast ultrasound due 3/2025. 2. Follow up office visit due 1 year. 3. Advised monthly self breast examinations and advised her to contact me if she has any concerns. 4. High risk screening breast MRI due 5/2025.

## 2024-09-12 NOTE — CONSULT LETTER
[Dear  ___] : Dear  [unfilled], [Courtesy Letter:] : I had the pleasure of seeing your patient, [unfilled], in my office today. [Please see my note below.] : Please see my note below. [Sincerely,] : Sincerely, [FreeTextEntry3] : Susan M. Palleschi, MD, FACS Division of Breast Surgery Director, Breast Surgery [address] [address] (Phone) [phone] (Fax) [phone]

## 2024-09-13 ENCOUNTER — NON-APPOINTMENT (OUTPATIENT)
Age: 59
End: 2024-09-13

## 2024-09-13 ENCOUNTER — APPOINTMENT (OUTPATIENT)
Dept: PLASTIC SURGERY | Facility: CLINIC | Age: 59
End: 2024-09-13
Payer: COMMERCIAL

## 2024-09-13 VITALS
DIASTOLIC BLOOD PRESSURE: 88 MMHG | TEMPERATURE: 98.3 F | HEIGHT: 61 IN | HEART RATE: 73 BPM | OXYGEN SATURATION: 99 % | BODY MASS INDEX: 31.72 KG/M2 | SYSTOLIC BLOOD PRESSURE: 138 MMHG | WEIGHT: 168 LBS

## 2024-09-13 DIAGNOSIS — N60.91 UNSPECIFIED BENIGN MAMMARY DYSPLASIA OF RIGHT BREAST: ICD-10-CM

## 2024-09-13 DIAGNOSIS — Z91.89 OTHER SPECIFIED PERSONAL RISK FACTORS, NOT ELSEWHERE CLASSIFIED: ICD-10-CM

## 2024-09-13 DIAGNOSIS — Z86.000 PERSONAL HISTORY OF IN-SITU NEOPLASM OF BREAST: ICD-10-CM

## 2024-09-13 PROCEDURE — 99213 OFFICE O/P EST LOW 20 MIN: CPT

## 2024-09-13 NOTE — CONSULT LETTER
[Dear  ___] : Dear  [unfilled], [Courtesy Letter:] : I had the pleasure of seeing your patient, [unfilled], in my office today. [Please see my note below.] : Please see my note below. [Sincerely,] : Sincerely, [FreeTextEntry3] : Susan M. Palleschi, MD, FACS Division of Breast Surgery Director, Breast Surgery [address] [address] (Phone) [phone] (Fax) [phone]

## 2024-09-13 NOTE — CONSULT LETTER
[Dear  ___] : Dear  [unfilled], [Courtesy Letter:] : I had the pleasure of seeing your patient, [unfilled], in my office today. [Please see my note below.] : Please see my note below. [Sincerely,] : Sincerely, [FreeTextEntry3] : Susan M. Palleschi, MD, FACS Division of Breast Surgery Director, Breast Surgery [address] [address] (Phone) [phone] (Fax) [phone]

## 2024-09-13 NOTE — ASSESSMENT
[FreeTextEntry1] : History of right breast ADH, ALH and LCIS She is at increased risk for breast cancer Clinical breast exam negative  1. Annual bilateral mammogram and breast ultrasound due 3/2025. 2. Follow up office visit due 1 year. 3. Advised monthly self breast examinations and advised her to contact me if she has any concerns. 4. High risk screening breast MRI due 9/2025 (alternating with the timing of her annual mammogram). 5. I discussed with her risk reduction option with endocrine therapy for her previous risk ALH, ADH and LCIS. Previously she deferred meeting with medical oncology. I will refer her to [facility].

## 2024-09-13 NOTE — PHYSICAL EXAM
[Normocephalic] : normocephalic [Atraumatic] : atraumatic [EOMI] : extra ocular movement intact [Sclera nonicteric] : sclera nonicteric [Supple] : supple [No Supraclavicular Adenopathy] : no supraclavicular adenopathy [No Cervical Adenopathy] : no cervical adenopathy [No Thyromegaly] : no thyromegaly [Examined in the supine and seated position] : examined in the supine and seated position [Asymmetrical] : asymmetrical [No dominant masses] : no dominant masses in right breast  [No dominant masses] : no dominant masses left breast [No Nipple Retraction] : no left nipple retraction [No Nipple Discharge] : no left nipple discharge [No Axillary Lymphadenopathy] : no left axillary lymphadenopathy [No Edema] : no edema [No Rashes] : no rashes [No Ulceration] : no ulceration [de-identified] : her left breast is larger than her right [de-identified] : right breast scar

## 2024-09-13 NOTE — PHYSICAL EXAM
[Normocephalic] : normocephalic [Atraumatic] : atraumatic [EOMI] : extra ocular movement intact [Sclera nonicteric] : sclera nonicteric [Supple] : supple [No Supraclavicular Adenopathy] : no supraclavicular adenopathy [No Cervical Adenopathy] : no cervical adenopathy [No Thyromegaly] : no thyromegaly [Examined in the supine and seated position] : examined in the supine and seated position [Asymmetrical] : asymmetrical [No dominant masses] : no dominant masses in right breast  [No dominant masses] : no dominant masses left breast [No Nipple Retraction] : no left nipple retraction [No Nipple Discharge] : no left nipple discharge [No Axillary Lymphadenopathy] : no left axillary lymphadenopathy [No Edema] : no edema [No Rashes] : no rashes [No Ulceration] : no ulceration [de-identified] : her left breast is larger than her right [de-identified] : right breast scar

## 2024-09-13 NOTE — HISTORY OF PRESENT ILLNESS
[FreeTextEntry1] : Patient is a 59 year old female here today for a follow up visit. She has a history of right breast atypical lobular hyperplasia (ALH), atypical ductal hyperplasia (ADH) and (LCIS).  12/06/2012 Right 10:00 surgical excision for ALH.  5/29/14 right central outer surgical excision for LCIS and ADH 1/18/2018 Right upper outer surgical excision for ALH and ADH. She deferred meeting with a medical oncologist to discuss antihormonal therapy for breast cancer prevention. 2020 s/p right breast excisional biopsy for calcifications. Pathology reportedly benign (surgeon in [facility]) 2/08/2023 Bilateral mammogram: No suspicious masses, calcifications, or other findings are seen in the right breast. Postoperative findings are seen in the right breast. A cyst is seen in the right 4:00 axis. Benign-appearing calcifications are noted in both breasts. A 4 mm mass is questioned in the left retroareolar region posterior depth. 2/0/82023 Left callback mammogram: No suspicious masses, calcifications, or other findings are seen. The left retroareolar posterior depth mass questioned on screening is not reproduced on spot compression imaging. No sonographic correlate is seen. Bilateral ultrasound: No suspicious abnormalities were seen sonographically. Scarring is seen in the right 10:00 axis. There are multiple bilateral cysts and cysts with debris. They average 6 mm mm in the right breast and range from 2-11 mm in the left breast. Mammogram recommended in 1 year. BI-RADS 2. 3/6/2024 Bilateral mammogram: Dense breasts. Postoperative findings are seen in the right breast. A cyst is seen in the right 4:00 axis. Benign-appearing calcifications are noted in both breasts. BI-RADS 2. Bilateral ultrasound: Scarring is seen in the right 10:00 axis. There are multiple bilateral cysts and cysts with debris. They range from 4-6 mm in the right breast and 3-11 mm in the left breast. BI-RADS 2. 5/10/2024 Bilateral MRI: Benign. BI-RADS 1. She denies any concerns on self breast exam. She states that her niece was recently diagnosed with breast cancer.

## 2024-09-13 NOTE — ASSESSMENT
[FreeTextEntry1] : History of right breast ADH, ALH and LCIS She is at increased risk for breast cancer Clinical breast exam negative  1. Annual bilateral mammogram and breast ultrasound due 3/2025. 2. Follow up office visit due 1 year. 3. Advised monthly self breast examinations and advised her to contact me if she has any concerns. 4. High risk screening breast MRI due 9/2025 (alternating with the timing of her annual mammogram). 5. I discussed with her risk reduction option with endocrine therapy for her previous risk ALH, ADH and LCIS. Previously she deferred meeting with medical oncology. I will refer her to [facility].

## 2024-09-13 NOTE — HISTORY OF PRESENT ILLNESS
[FreeTextEntry1] : Patient is a 59 year old female here today for a follow up visit. She has a history of right breast atypical lobular hyperplasia (ALH), atypical ductal hyperplasia (ADH) and (LCIS).  12/06/2012 Right 10:00 surgical excision for ALH.  5/29/14 right central outer surgical excision for LCIS and ADH 1/18/2018 Right upper outer surgical excision for ALH and ADH. She deferred meeting with a medical oncologist to discuss antihormonal therapy for breast cancer prevention. 2020 s/p right breast excisional biopsy for calcifications. Pathology reportedly benign (surgeon in [facility]) 2/08/2023 Bilateral mammogram: No suspicious masses, calcifications, or other findings are seen in the right breast. Postoperative findings are seen in the right breast. A cyst is seen in the right 4:00 axis. Benign-appearing calcifications are noted in both breasts. A 4 mm mass is questioned in the left retroareolar region posterior depth. 2/0/82023 Left callback mammogram: No suspicious masses, calcifications, or other findings are seen. The left retroareolar posterior depth mass questioned on screening is not reproduced on spot compression imaging. No sonographic correlate is seen. Bilateral ultrasound: No suspicious abnormalities were seen sonographically. Scarring is seen in the right 10:00 axis. There are multiple bilateral cysts and cysts with debris. They average 6 mm mm in the right breast and range from 2-11 mm in the left breast. Mammogram recommended in 1 year. BI-RADS 2. 3/6/2024 Bilateral mammogram: Dense breasts. Postoperative findings are seen in the right breast. A cyst is seen in the right 4:00 axis. Benign-appearing calcifications are noted in both breasts. BI-RADS 2. Bilateral ultrasound: Scarring is seen in the right 10:00 axis. There are multiple bilateral cysts and cysts with debris. They range from 4-6 mm in the right breast and 3-11 mm in the left breast. BI-RADS 2. 5/10/2024 Bilateral MRI: Benign. BI-RADS 1. She denies any concerns on self breast exam. She states that her niece was recently diagnosed with breast cancer.

## 2024-10-09 ENCOUNTER — OUTPATIENT (OUTPATIENT)
Dept: OUTPATIENT SERVICES | Facility: HOSPITAL | Age: 59
LOS: 1 days | Discharge: HOME | End: 2024-10-09

## 2024-10-09 DIAGNOSIS — N60.89 OTHER BENIGN MAMMARY DYSPLASIAS OF UNSPECIFIED BREAST: ICD-10-CM

## 2024-10-09 DIAGNOSIS — Z98.890 OTHER SPECIFIED POSTPROCEDURAL STATES: Chronic | ICD-10-CM

## 2024-11-15 ENCOUNTER — APPOINTMENT (OUTPATIENT)
Dept: HEMATOLOGY ONCOLOGY | Facility: CLINIC | Age: 59
End: 2024-11-15

## 2024-12-26 ENCOUNTER — NON-APPOINTMENT (OUTPATIENT)
Age: 59
End: 2024-12-26

## 2025-06-06 NOTE — PRE-ANESTHESIA EVALUATION ADULT - WEIGHT IN KG
----- Message from Althea OLIVA MA sent at 6/6/2025 12:38 PM CDT -----    ----- Message -----  From: Cathleen Carrasco  Sent: 6/6/2025  12:33 PM CDT  To: [place] [employer]; #    Please print prescan out of Media Tab and then complete and sign.     Once form is completed and signed, please fax to [phone].      Please direct any questions to [phone].     Thanks,   Forms Completion Team.   [phone]

## 2025-07-09 ENCOUNTER — APPOINTMENT (OUTPATIENT)
Dept: ULTRASOUND IMAGING | Facility: CLINIC | Age: 60
End: 2025-07-09

## (undated) DEVICE — PACK GENERAL LAPAROSCOPY

## (undated) DEVICE — TROCAR COVIDIEN BLUNT TIP HASSAN 10MM

## (undated) DEVICE — SOL INJ NS 0.9% 1000ML

## (undated) DEVICE — GLV 7.5 PROTEXIS (WHITE)

## (undated) DEVICE — D HELP - CLEARVIEW CLEARIFY SYSTEM

## (undated) DEVICE — DRAPE MAYO STAND 30"

## (undated) DEVICE — POSITIONER FOAM EGG CRATE ULNAR 2PCS (PINK)

## (undated) DEVICE — DRAPE INSTRUMENT POUCH 6.75" X 11"

## (undated) DEVICE — DRAPE TOWEL BLUE 17" X 24"

## (undated) DEVICE — APPLICATOR SURGICEL LAP TROCAR POINT 2.5MM X 150MM

## (undated) DEVICE — SYR LUER LOK 20CC

## (undated) DEVICE — WARMING BLANKET UPPER ADULT

## (undated) DEVICE — MARKING PEN W RULER

## (undated) DEVICE — GLV 7 PROTEXIS (BLUE)

## (undated) DEVICE — NDL HYPO SAFE 18G X 1.5" (PINK)

## (undated) DEVICE — SOL IRR POUR NS 0.9% 500ML

## (undated) DEVICE — SUT POLYSORB 0 36" GU-46

## (undated) DEVICE — TUBING STRYKER PNEUMOCLEAR SMOKE HEAT HUMID

## (undated) DEVICE — TROCAR APPLIED MEDICAL KII BALLOON BLUNT TIP 12MM X 100MM

## (undated) DEVICE — MEDICATION LABELS W MARKER

## (undated) DEVICE — SUT MONOCRYL 4-0 27" PS-2 UNDYED

## (undated) DEVICE — SOL IRR POUR H2O 250ML

## (undated) DEVICE — PREP CHLORAPREP HI-LITE ORANGE 26ML

## (undated) DEVICE — TROCAR ETHICON ENDOPATH XCEL BLADELESS 5MM X 100MM STABILITY

## (undated) DEVICE — SYR LUER LOK 3CC

## (undated) DEVICE — DISSECTOR ENDO PEANUT 5MM

## (undated) DEVICE — ENDOCATCH 10MM SPECIMEN POUCH

## (undated) DEVICE — PACK LAP CHOLE LAP APPENDECTOMY

## (undated) DEVICE — SYR LUER LOK 10CC

## (undated) DEVICE — DRSG STERISTRIPS 0.5 X 4"

## (undated) DEVICE — SPECIMEN CONTAINER 100ML

## (undated) DEVICE — BLADE SCALPEL SAFETYLOCK #11

## (undated) DEVICE — NSA-STRYKER VIDEO TOWER: Type: DURABLE MEDICAL EQUIPMENT

## (undated) DEVICE — TUBING SUCTION 20FT

## (undated) DEVICE — VENODYNE/SCD SLEEVE CALF MEDIUM